# Patient Record
Sex: FEMALE | Race: BLACK OR AFRICAN AMERICAN
[De-identification: names, ages, dates, MRNs, and addresses within clinical notes are randomized per-mention and may not be internally consistent; named-entity substitution may affect disease eponyms.]

---

## 2019-11-30 ENCOUNTER — HOSPITAL ENCOUNTER (EMERGENCY)
Dept: HOSPITAL 94 - ER | Age: 39
Discharge: HOME | End: 2019-11-30
Payer: MEDICAID

## 2019-11-30 VITALS — DIASTOLIC BLOOD PRESSURE: 56 MMHG | SYSTOLIC BLOOD PRESSURE: 112 MMHG

## 2019-11-30 VITALS — WEIGHT: 240.5 LBS | HEIGHT: 71 IN | BODY MASS INDEX: 33.67 KG/M2

## 2019-11-30 DIAGNOSIS — R07.89: Primary | ICD-10-CM

## 2019-11-30 DIAGNOSIS — Z90.49: ICD-10-CM

## 2019-11-30 DIAGNOSIS — R06.02: ICD-10-CM

## 2019-11-30 DIAGNOSIS — Z79.899: ICD-10-CM

## 2019-11-30 DIAGNOSIS — F15.90: ICD-10-CM

## 2019-11-30 DIAGNOSIS — F17.200: ICD-10-CM

## 2019-11-30 DIAGNOSIS — Z88.6: ICD-10-CM

## 2019-11-30 LAB
ALBUMIN SERPL BCP-MCNC: 3.4 G/DL (ref 3.4–5)
ALBUMIN/GLOB SERPL: 0.8 {RATIO} (ref 1.1–1.5)
ALP SERPL-CCNC: 58 IU/L (ref 46–116)
ALT SERPL W P-5'-P-CCNC: 31 U/L (ref 12–78)
AMORPH URATE CRY #/AREA URNS HPF: (no result) /[HPF]
ANION GAP SERPL CALCULATED.3IONS-SCNC: 9 MMOL/L (ref 8–16)
AST SERPL W P-5'-P-CCNC: 27 U/L (ref 10–37)
BACTERIA URNS QL MICRO: (no result) /HPF
BASOPHILS # BLD AUTO: 0 X10'3 (ref 0–0.2)
BASOPHILS NFR BLD AUTO: 0.6 % (ref 0–1)
BILIRUB SERPL-MCNC: 0.4 MG/DL (ref 0.1–1)
BUN SERPL-MCNC: 13 MG/DL (ref 7–18)
BUN/CREAT SERPL: 15.7 (ref 6.6–38)
CALCIUM SERPL-MCNC: 8.7 MG/DL (ref 8.5–10.1)
CHLORIDE SERPL-SCNC: 103 MMOL/L (ref 99–107)
CLARITY UR: (no result)
CO2 SERPL-SCNC: 28.7 MMOL/L (ref 24–32)
COLOR UR: YELLOW
CREAT SERPL-MCNC: 0.83 MG/DL (ref 0.4–0.9)
DEPRECATED SQUAMOUS URNS QL MICRO: (no result) /LPF
EOSINOPHIL # BLD AUTO: 0.1 X10'3 (ref 0–0.9)
EOSINOPHIL NFR BLD AUTO: 2.1 % (ref 0–6)
ERYTHROCYTE [DISTWIDTH] IN BLOOD BY AUTOMATED COUNT: 17.4 % (ref 11.5–14.5)
GFR SERPL CREATININE-BSD FRML MDRD: > 90 ML/MIN
GLUCOSE SERPL-MCNC: 108 MG/DL (ref 70–104)
GLUCOSE UR STRIP-MCNC: NEGATIVE MG/DL
HCG UR QL: NEGATIVE
HCT VFR BLD AUTO: 32.3 % (ref 35–45)
HGB BLD-MCNC: 10.6 G/DL (ref 12–16)
HGB UR QL STRIP: NEGATIVE
KETONES UR STRIP-MCNC: NEGATIVE MG/DL
LEUKOCYTE ESTERASE UR QL STRIP: NEGATIVE
LYMPHOCYTES # BLD AUTO: 1.8 X10'3 (ref 1.1–4.8)
LYMPHOCYTES NFR BLD AUTO: 28.4 % (ref 21–51)
MCH RBC QN AUTO: 24.5 PG (ref 27–31)
MCHC RBC AUTO-ENTMCNC: 32.7 G/DL (ref 33–36.5)
MCV RBC AUTO: 74.9 FL (ref 78–98)
MONOCYTES # BLD AUTO: 0.5 X10'3 (ref 0–0.9)
MONOCYTES NFR BLD AUTO: 7.4 % (ref 2–12)
MUCOUS THREADS URNS QL MICRO: (no result) /LPF
NEUTROPHILS # BLD AUTO: 3.9 X10'3 (ref 1.8–7.7)
NEUTROPHILS NFR BLD AUTO: 61.5 % (ref 42–75)
NITRITE UR QL STRIP: NEGATIVE
PH UR STRIP: 7 [PH] (ref 4.8–8)
PLATELET # BLD AUTO: 364 X10'3 (ref 140–440)
PMV BLD AUTO: 6.8 FL (ref 7.4–10.4)
POTASSIUM SERPL-SCNC: 3.3 MMOL/L (ref 3.5–5.1)
PROT SERPL-MCNC: 7.6 G/DL (ref 6.4–8.2)
PROT UR QL STRIP: NEGATIVE MG/DL
RBC # BLD AUTO: 4.32 X10'6 (ref 4.2–5.6)
RBC #/AREA URNS HPF: (no result) /HPF (ref 0–2)
SODIUM SERPL-SCNC: 141 MMOL/L (ref 135–145)
SP GR UR STRIP: 1.01 (ref 1–1.03)
URN COLLECT METHOD CLASS: (no result)
UROBILINOGEN UR STRIP-MCNC: 1 E.U/DL (ref 0.2–1)
WBC # BLD AUTO: 6.4 X10'3 (ref 4.5–11)
WBC #/AREA URNS HPF: (no result) /HPF (ref 0–4)

## 2019-11-30 PROCEDURE — 93005 ELECTROCARDIOGRAM TRACING: CPT

## 2019-11-30 PROCEDURE — 83880 ASSAY OF NATRIURETIC PEPTIDE: CPT

## 2019-11-30 PROCEDURE — 85025 COMPLETE CBC W/AUTO DIFF WBC: CPT

## 2019-11-30 PROCEDURE — 99284 EMERGENCY DEPT VISIT MOD MDM: CPT

## 2019-11-30 PROCEDURE — 81025 URINE PREGNANCY TEST: CPT

## 2019-11-30 PROCEDURE — 81001 URINALYSIS AUTO W/SCOPE: CPT

## 2019-11-30 PROCEDURE — 84484 ASSAY OF TROPONIN QUANT: CPT

## 2019-11-30 PROCEDURE — 36415 COLL VENOUS BLD VENIPUNCTURE: CPT

## 2019-11-30 PROCEDURE — 71046 X-RAY EXAM CHEST 2 VIEWS: CPT

## 2019-11-30 PROCEDURE — 80053 COMPREHEN METABOLIC PANEL: CPT

## 2019-11-30 NOTE — NUR
relieving RN for break, pt is 40 yo female c/o chest pain, midsternal radiating 
to left shoulder off and on x"few weeks", +n/v, +SOB, pt was seen at [facility] [address] and dx with enlarged heart 2weeks ago, pt said she does have 
increased stress in life with custody gabriel, waiting to be evaluated, report 
to Jose COOPER

## 2020-09-22 ENCOUNTER — HOSPITAL ENCOUNTER (EMERGENCY)
Dept: HOSPITAL 94 - ER | Age: 40
Discharge: HOME | End: 2020-09-22
Payer: MEDICAID

## 2020-09-22 VITALS — HEIGHT: 71 IN | WEIGHT: 242.51 LBS | BODY MASS INDEX: 33.95 KG/M2

## 2020-09-22 VITALS — DIASTOLIC BLOOD PRESSURE: 87 MMHG | SYSTOLIC BLOOD PRESSURE: 133 MMHG

## 2020-09-22 DIAGNOSIS — F17.200: ICD-10-CM

## 2020-09-22 DIAGNOSIS — N61.0: Primary | ICD-10-CM

## 2020-09-22 DIAGNOSIS — F12.90: ICD-10-CM

## 2020-09-22 DIAGNOSIS — N39.0: ICD-10-CM

## 2020-09-22 DIAGNOSIS — Z88.6: ICD-10-CM

## 2020-09-22 DIAGNOSIS — Z90.49: ICD-10-CM

## 2020-09-22 DIAGNOSIS — Z79.899: ICD-10-CM

## 2020-09-22 LAB
BACTERIA URNS QL MICRO: (no result) /HPF
CLARITY UR: (no result)
COLOR UR: YELLOW
DEPRECATED SQUAMOUS URNS QL MICRO: (no result) /LPF
GLUCOSE UR STRIP-MCNC: NEGATIVE MG/DL
HCG UR QL: NEGATIVE
HGB UR QL STRIP: (no result)
KETONES UR STRIP-MCNC: NEGATIVE MG/DL
LEUKOCYTE ESTERASE UR QL STRIP: (no result)
MUCOUS THREADS URNS QL MICRO: (no result) /LPF
NITRITE UR QL STRIP: POSITIVE
PH UR STRIP: 6 [PH] (ref 4.8–8)
PROT UR QL STRIP: (no result) MG/DL
RBC #/AREA URNS HPF: (no result) /HPF (ref 0–2)
SP GR UR STRIP: >=1.03 (ref 1–1.03)
URN COLLECT METHOD CLASS: (no result)
UROBILINOGEN UR STRIP-MCNC: 2 E.U/DL (ref 0.2–1)
WBC #/AREA URNS HPF: (no result) /HPF (ref 0–4)

## 2020-09-22 PROCEDURE — 99283 EMERGENCY DEPT VISIT LOW MDM: CPT

## 2020-09-22 PROCEDURE — 87088 URINE BACTERIA CULTURE: CPT

## 2020-09-22 PROCEDURE — 81025 URINE PREGNANCY TEST: CPT

## 2020-09-22 PROCEDURE — 87186 SC STD MICRODIL/AGAR DIL: CPT

## 2020-09-22 PROCEDURE — 81001 URINALYSIS AUTO W/SCOPE: CPT

## 2020-09-22 PROCEDURE — 87077 CULTURE AEROBIC IDENTIFY: CPT

## 2020-09-30 ENCOUNTER — HOSPITAL ENCOUNTER (EMERGENCY)
Dept: HOSPITAL 94 - ER | Age: 40
Discharge: HOME | End: 2020-09-30
Payer: MEDICAID

## 2020-09-30 VITALS — SYSTOLIC BLOOD PRESSURE: 133 MMHG | DIASTOLIC BLOOD PRESSURE: 84 MMHG

## 2020-09-30 VITALS — HEIGHT: 71 IN | WEIGHT: 245.37 LBS | BODY MASS INDEX: 34.35 KG/M2

## 2020-09-30 DIAGNOSIS — F12.90: ICD-10-CM

## 2020-09-30 DIAGNOSIS — N61.0: Primary | ICD-10-CM

## 2020-09-30 DIAGNOSIS — Z79.899: ICD-10-CM

## 2020-09-30 DIAGNOSIS — Z79.2: ICD-10-CM

## 2020-09-30 DIAGNOSIS — Z88.6: ICD-10-CM

## 2020-09-30 DIAGNOSIS — Z90.49: ICD-10-CM

## 2020-09-30 PROCEDURE — 99284 EMERGENCY DEPT VISIT MOD MDM: CPT

## 2020-09-30 PROCEDURE — 99283 EMERGENCY DEPT VISIT LOW MDM: CPT

## 2021-05-07 ENCOUNTER — HOSPITAL ENCOUNTER (EMERGENCY)
Dept: HOSPITAL 94 - ER | Age: 41
Discharge: HOME | End: 2021-05-07
Payer: MEDICAID

## 2021-05-07 VITALS — WEIGHT: 240.3 LBS | BODY MASS INDEX: 33.64 KG/M2 | HEIGHT: 71 IN

## 2021-05-07 VITALS — DIASTOLIC BLOOD PRESSURE: 81 MMHG | SYSTOLIC BLOOD PRESSURE: 132 MMHG

## 2021-05-07 DIAGNOSIS — N39.0: ICD-10-CM

## 2021-05-07 DIAGNOSIS — Z87.440: ICD-10-CM

## 2021-05-07 DIAGNOSIS — M54.5: ICD-10-CM

## 2021-05-07 DIAGNOSIS — Z88.8: ICD-10-CM

## 2021-05-07 DIAGNOSIS — Z79.2: ICD-10-CM

## 2021-05-07 DIAGNOSIS — Z90.49: ICD-10-CM

## 2021-05-07 DIAGNOSIS — R30.0: ICD-10-CM

## 2021-05-07 DIAGNOSIS — J02.8: Primary | ICD-10-CM

## 2021-05-07 DIAGNOSIS — R31.9: ICD-10-CM

## 2021-05-07 DIAGNOSIS — Z79.899: ICD-10-CM

## 2021-05-07 DIAGNOSIS — F12.90: ICD-10-CM

## 2021-05-07 LAB
ALBUMIN SERPL BCP-MCNC: 2.9 G/DL (ref 3.4–5)
ALBUMIN/GLOB SERPL: 0.6 {RATIO} (ref 1.1–1.5)
ALP SERPL-CCNC: 82 IU/L (ref 46–116)
ALT SERPL W P-5'-P-CCNC: 17 U/L (ref 12–78)
ANION GAP SERPL CALCULATED.3IONS-SCNC: 10 MMOL/L (ref 8–16)
AST SERPL W P-5'-P-CCNC: 13 U/L (ref 10–37)
BACTERIA URNS QL MICRO: (no result) /HPF
BASOPHILS # BLD AUTO: 0 X10'3 (ref 0–0.2)
BASOPHILS NFR BLD AUTO: 0.2 % (ref 0–1)
BILIRUB SERPL-MCNC: 0.2 MG/DL (ref 0.1–1)
BUN SERPL-MCNC: 8 MG/DL (ref 7–18)
BUN/CREAT SERPL: 10.4 (ref 6.6–38)
CALCIUM SERPL-MCNC: 8.3 MG/DL (ref 8.5–10.1)
CHLORIDE SERPL-SCNC: 103 MMOL/L (ref 99–107)
CLARITY UR: (no result)
CO2 SERPL-SCNC: 28.2 MMOL/L (ref 24–32)
COLOR UR: YELLOW
CREAT SERPL-MCNC: 0.77 MG/DL (ref 0.4–0.9)
DEPRECATED SQUAMOUS URNS QL MICRO: (no result) /LPF
EOSINOPHIL # BLD AUTO: 0.2 X10'3 (ref 0–0.9)
EOSINOPHIL NFR BLD AUTO: 1.1 % (ref 0–6)
ERYTHROCYTE [DISTWIDTH] IN BLOOD BY AUTOMATED COUNT: 18.5 % (ref 11.5–14.5)
GFR SERPL CREATININE-BSD FRML MDRD: > 90 ML/MIN
GLUCOSE SERPL-MCNC: 105 MG/DL (ref 70–104)
GLUCOSE UR STRIP-MCNC: NEGATIVE MG/DL
HCG UR QL: NEGATIVE
HCT VFR BLD AUTO: 31.3 % (ref 35–45)
HGB BLD-MCNC: 10 G/DL (ref 12–16)
HGB UR QL STRIP: (no result)
KETONES UR STRIP-MCNC: NEGATIVE MG/DL
LEUKOCYTE ESTERASE UR QL STRIP: NEGATIVE
LIPASE SERPL-CCNC: 96 U/L (ref 73–393)
LYMPHOCYTES # BLD AUTO: 1.8 X10'3 (ref 1.1–4.8)
LYMPHOCYTES NFR BLD AUTO: 12.4 % (ref 21–51)
MCH RBC QN AUTO: 23.8 PG (ref 27–31)
MCHC RBC AUTO-ENTMCNC: 32 G/DL (ref 33–36.5)
MCV RBC AUTO: 74.5 FL (ref 78–98)
MONOCYTES # BLD AUTO: 1.2 X10'3 (ref 0–0.9)
MONOCYTES NFR BLD AUTO: 8.2 % (ref 2–12)
NEUTROPHILS # BLD AUTO: 11.3 X10'3 (ref 1.8–7.7)
NEUTROPHILS NFR BLD AUTO: 78.1 % (ref 42–75)
NITRITE UR QL STRIP: POSITIVE
PH UR STRIP: 6 [PH] (ref 4.8–8)
PLATELET # BLD AUTO: 373 X10'3 (ref 140–440)
PMV BLD AUTO: 6.9 FL (ref 7.4–10.4)
POTASSIUM SERPL-SCNC: 3.5 MMOL/L (ref 3.5–5.1)
PROT SERPL-MCNC: 7.4 G/DL (ref 6.4–8.2)
PROT UR QL STRIP: NEGATIVE MG/DL
RBC # BLD AUTO: 4.2 X10'6 (ref 4.2–5.6)
RBC #/AREA URNS HPF: (no result) /HPF (ref 0–2)
SODIUM SERPL-SCNC: 141 MMOL/L (ref 135–145)
SP GR UR STRIP: 1.02 (ref 1–1.03)
URN COLLECT METHOD CLASS: (no result)
UROBILINOGEN UR STRIP-MCNC: 0.2 E.U/DL (ref 0.2–1)
WBC # BLD AUTO: 14.4 X10'3 (ref 4.5–11)
WBC #/AREA URNS HPF: (no result) /HPF (ref 0–4)

## 2021-05-07 PROCEDURE — 87186 SC STD MICRODIL/AGAR DIL: CPT

## 2021-05-07 PROCEDURE — 87491 CHLMYD TRACH DNA AMP PROBE: CPT

## 2021-05-07 PROCEDURE — 80053 COMPREHEN METABOLIC PANEL: CPT

## 2021-05-07 PROCEDURE — 36415 COLL VENOUS BLD VENIPUNCTURE: CPT

## 2021-05-07 PROCEDURE — 96372 THER/PROPH/DIAG INJ SC/IM: CPT

## 2021-05-07 PROCEDURE — 81025 URINE PREGNANCY TEST: CPT

## 2021-05-07 PROCEDURE — 81001 URINALYSIS AUTO W/SCOPE: CPT

## 2021-05-07 PROCEDURE — 99285 EMERGENCY DEPT VISIT HI MDM: CPT

## 2021-05-07 PROCEDURE — 96360 HYDRATION IV INFUSION INIT: CPT

## 2021-05-07 PROCEDURE — 71045 X-RAY EXAM CHEST 1 VIEW: CPT

## 2021-05-07 PROCEDURE — 87591 N.GONORRHOEAE DNA AMP PROB: CPT

## 2021-05-07 PROCEDURE — 93005 ELECTROCARDIOGRAM TRACING: CPT

## 2021-05-07 PROCEDURE — 87077 CULTURE AEROBIC IDENTIFY: CPT

## 2021-05-07 PROCEDURE — 87088 URINE BACTERIA CULTURE: CPT

## 2021-05-07 PROCEDURE — 85025 COMPLETE CBC W/AUTO DIFF WBC: CPT

## 2021-05-07 PROCEDURE — 83690 ASSAY OF LIPASE: CPT

## 2022-07-25 ENCOUNTER — HOSPITAL ENCOUNTER (EMERGENCY)
Dept: HOSPITAL 94 - ER | Age: 42
LOS: 1 days | Discharge: HOME | End: 2022-07-26
Payer: MEDICAID

## 2022-07-25 VITALS — WEIGHT: 240.5 LBS | HEIGHT: 70 IN | BODY MASS INDEX: 34.43 KG/M2

## 2022-07-25 VITALS — SYSTOLIC BLOOD PRESSURE: 131 MMHG | DIASTOLIC BLOOD PRESSURE: 77 MMHG

## 2022-07-25 DIAGNOSIS — F12.10: ICD-10-CM

## 2022-07-25 DIAGNOSIS — Z79.2: ICD-10-CM

## 2022-07-25 DIAGNOSIS — L02.818: Primary | ICD-10-CM

## 2022-07-25 DIAGNOSIS — N76.0: ICD-10-CM

## 2022-07-25 DIAGNOSIS — C50.919: ICD-10-CM

## 2022-07-25 DIAGNOSIS — Z88.6: ICD-10-CM

## 2022-07-25 DIAGNOSIS — Z87.448: ICD-10-CM

## 2022-07-25 DIAGNOSIS — Z79.82: ICD-10-CM

## 2022-07-25 DIAGNOSIS — Z79.899: ICD-10-CM

## 2022-07-25 PROCEDURE — 81025 URINE PREGNANCY TEST: CPT

## 2022-07-25 PROCEDURE — 81001 URINALYSIS AUTO W/SCOPE: CPT

## 2022-07-25 PROCEDURE — 36415 COLL VENOUS BLD VENIPUNCTURE: CPT

## 2022-07-25 PROCEDURE — 80048 BASIC METABOLIC PNL TOTAL CA: CPT

## 2022-07-25 PROCEDURE — 84702 CHORIONIC GONADOTROPIN TEST: CPT

## 2022-07-25 PROCEDURE — 99284 EMERGENCY DEPT VISIT MOD MDM: CPT

## 2022-07-25 PROCEDURE — 85025 COMPLETE CBC W/AUTO DIFF WBC: CPT

## 2022-07-25 PROCEDURE — 81003 URINALYSIS AUTO W/O SCOPE: CPT

## 2022-07-25 PROCEDURE — 85007 BL SMEAR W/DIFF WBC COUNT: CPT

## 2022-07-26 LAB
ALBUMIN SERPL BCP-MCNC: 3.6 G/DL (ref 3.4–5)
ANION GAP SERPL CALCULATED.3IONS-SCNC: 11 MMOL/L (ref 8–16)
ANISOCYTOSIS BLD QL SMEAR: (no result)
BACTERIA URNS QL MICRO: (no result) /HPF
BASOPHILS # BLD AUTO: 0 X10'3 (ref 0–0.2)
BASOPHILS NFR BLD AUTO: 0.1 % (ref 0–1)
BUN SERPL-MCNC: 12 MG/DL (ref 7–18)
BUN/CREAT SERPL: 15.8 (ref 6.6–38)
CALCIUM SERPL-MCNC: 9.1 MG/DL (ref 8.5–10.1)
CHLORIDE SERPL-SCNC: 104 MMOL/L (ref 99–107)
CLARITY UR: CLEAR
CO2 SERPL-SCNC: 27.9 MMOL/L (ref 24–32)
COLOR UR: YELLOW
CREAT SERPL-MCNC: 0.76 MG/DL (ref 0.4–0.9)
DEPRECATED SQUAMOUS URNS QL MICRO: (no result) /LPF
EOSINOPHIL # BLD AUTO: 0 X10'3 (ref 0–0.9)
EOSINOPHIL NFR BLD AUTO: 0.4 % (ref 0–6)
ERYTHROCYTE [DISTWIDTH] IN BLOOD BY AUTOMATED COUNT: 22.4 % (ref 11.5–14.5)
GFR SERPL CREATININE-BSD FRML MDRD: > 90 ML/MIN
GLUCOSE SERPL-MCNC: 79 MG/DL (ref 70–104)
GLUCOSE UR STRIP-MCNC: NEGATIVE MG/DL
HCG SERPL-ACNC: < 1 MIU/ML
HCG UR QL: NEGATIVE
HCT VFR BLD AUTO: 28.9 % (ref 35–45)
HGB BLD-MCNC: 9.7 G/DL (ref 12–16)
HGB UR QL STRIP: NEGATIVE
HYPOCHROMIA BLD QL SMEAR: (no result)
KETONES UR STRIP-MCNC: NEGATIVE MG/DL
LEUKOCYTE ESTERASE UR QL STRIP: NEGATIVE
LYMPHOCYTES # BLD AUTO: 1.1 X10'3 (ref 1.1–4.8)
LYMPHOCYTES NFR BLD AUTO: 20.4 % (ref 21–51)
LYMPHOCYTES NFR BLD MANUAL: 20 % (ref 21–51)
MCH RBC QN AUTO: 29.2 PG (ref 27–31)
MCHC RBC AUTO-ENTMCNC: 33.5 G/DL (ref 33–36.5)
MCV RBC AUTO: 87.1 FL (ref 78–98)
METAMYELOCYTES NFR BLD MANUAL: 2 % (ref 0–0)
MONOCYTES # BLD AUTO: 1.1 X10'3 (ref 0–0.9)
MONOCYTES NFR BLD AUTO: 19.9 % (ref 2–12)
MONOCYTES NFR BLD MANUAL: 17 % (ref 2–12)
MUCOUS THREADS URNS QL MICRO: (no result) /LPF
NEUTROPHILS # BLD AUTO: 3.3 X10'3 (ref 1.8–7.7)
NEUTROPHILS NFR BLD AUTO: 59.2 % (ref 42–75)
NEUTS BAND # BLD MANUAL: 11 % (ref 0–10)
NEUTS SEG NFR BLD MANUAL: 49 % (ref 42–75)
NITRITE UR QL STRIP: NEGATIVE
NRBC BLD MANUAL-RTO: 1 /100WBC (ref 0–0)
OVALOCYTES BLD QL SMEAR: (no result)
PH UR STRIP: 6 [PH] (ref 4.8–8)
PLATELET # BLD AUTO: 226 X10'3 (ref 140–440)
PLATELET BLD QL SMEAR: NORMAL
PMV BLD AUTO: 8.1 FL (ref 7.4–10.4)
POTASSIUM SERPL-SCNC: 3.2 MMOL/L (ref 3.5–5.1)
PROT UR QL STRIP: (no result) MG/DL
RBC # BLD AUTO: 3.31 X10'6 (ref 4.2–5.6)
RBC #/AREA URNS HPF: (no result) /HPF (ref 0–2)
RBC MORPH BLD: (no result)
SODIUM SERPL-SCNC: 143 MMOL/L (ref 135–145)
SP GR UR STRIP: >=1.03 (ref 1–1.03)
TOTAL CELLS COUNTED FLD: 100
URN COLLECT METHOD CLASS: (no result)
UROBILINOGEN UR STRIP-MCNC: 1 E.U/DL (ref 0.2–1)
VARIANT LYMPHS NFR BLD MANUAL: 1 % (ref 0–0)
WBC # BLD AUTO: 5.6 X10'3 (ref 4.5–11)
WBC #/AREA URNS HPF: (no result) /HPF (ref 0–4)